# Patient Record
Sex: MALE | Race: WHITE | NOT HISPANIC OR LATINO | Employment: PART TIME | URBAN - METROPOLITAN AREA
[De-identification: names, ages, dates, MRNs, and addresses within clinical notes are randomized per-mention and may not be internally consistent; named-entity substitution may affect disease eponyms.]

---

## 2017-10-10 ENCOUNTER — HOSPITAL ENCOUNTER (EMERGENCY)
Facility: HOSPITAL | Age: 22
Discharge: HOME | End: 2017-10-10
Attending: EMERGENCY MEDICINE | Admitting: EMERGENCY MEDICINE

## 2017-10-10 ENCOUNTER — APPOINTMENT (OUTPATIENT)
Dept: GENERAL RADIOLOGY | Facility: HOSPITAL | Age: 22
End: 2017-10-10

## 2017-10-10 VITALS
BODY MASS INDEX: 35.89 KG/M2 | DIASTOLIC BLOOD PRESSURE: 92 MMHG | TEMPERATURE: 98.6 F | SYSTOLIC BLOOD PRESSURE: 152 MMHG | HEART RATE: 107 BPM | RESPIRATION RATE: 16 BRPM | WEIGHT: 265 LBS | OXYGEN SATURATION: 99 % | HEIGHT: 72 IN

## 2017-10-10 DIAGNOSIS — S90.511A ABRASION OF RIGHT ANKLE, INITIAL ENCOUNTER: ICD-10-CM

## 2017-10-10 DIAGNOSIS — S93.401A SPRAIN OF RIGHT ANKLE, UNSPECIFIED LIGAMENT, INITIAL ENCOUNTER: Primary | ICD-10-CM

## 2017-10-10 PROCEDURE — 99283 EMERGENCY DEPT VISIT LOW MDM: CPT

## 2017-10-10 PROCEDURE — 73610 X-RAY EXAM OF ANKLE: CPT

## 2017-10-10 PROCEDURE — 99282 EMERGENCY DEPT VISIT SF MDM: CPT | Performed by: EMERGENCY MEDICINE

## 2017-10-11 NOTE — ED PROVIDER NOTES
Subjective   History of Present Illness  History of Present Illness    Chief complaint: Ankle injury    Location: Right ankle    Quality/Severity:  Moderate, sharp pain    Timing/Onset/Duration: Acute onset just prior to arrival    Modifying Factors: It hurts to bear weight, feels better to not bear weight    Associated Symptoms: No numbness, tingling, or weakness.  No other complaints.    Narrative: This 22-year-old white male twisted his right ankle trying to help an elderly person on the sidewalk.  He complains of right ankle pain.  He has difficulty bearing weight.  He denies any numbness, tingling, weakness, or change in color or temperature.  The patient has an abrasion just inferior to the lateral malleolus.    PCP:  No Known Provider      Review of Systems   Musculoskeletal:        Right ankle pain        Medication List      Notice     You have not been prescribed any medications.        History reviewed. No pertinent past medical history.    No Known Allergies    History reviewed. No pertinent surgical history.    History reviewed. No pertinent family history.    Social History     Social History   • Marital status: Single     Spouse name: N/A   • Number of children: N/A   • Years of education: N/A     Social History Main Topics   • Smoking status: Never Smoker   • Smokeless tobacco: None   • Alcohol use No   • Drug use: None   • Sexual activity: Not Asked     Other Topics Concern   • None     Social History Narrative   • None           Objective   Physical Exam   Constitutional: He is oriented to person, place, and time. He appears well-developed and well-nourished. No distress.   ED Triage Vitals:  Temp: 98.6 °F (37 °C) (10/10/17 2209)  Heart Rate: 107 (10/10/17 2209)  Resp: 16 (10/10/17 2209)  BP: 152/92 (10/10/17 2209)  SpO2: 99 % (10/10/17 2209)  Temp src: Oral (10/10/17 2209)  Heart Rate Source: Monitor (10/10/17 2209)  Patient Position: Sitting (10/10/17 2209)  BP Location: Right arm (10/10/17  2209)  FiO2 (%): n/a    The patient's vitals were reviewed by me.  Unless otherwise noted they are within normal limits.  Patient is hypertensive with a blood pressure 152/92.     Musculoskeletal:   There is tenderness upon palpation of the right lateral malleolus.  There is a scab just inferior to the lateral malleolus.  There is minimal to moderate spinal swelling.  The capillary refill is less than 2 seconds.  The sensation is intact.  There is a normal range of motion noted.  There is no joint laxity noted.  There is no proximal fibular tenderness.  Extremities otherwise without tenderness or deformity.  There is a 2 posterior cells pedis and posterior tibial pulse.   Neurological: He is alert and oriented to person, place, and time.   Skin: He is not diaphoretic.   Nursing note and vitals reviewed.      Procedures         ED Course  ED Course                  MDM  No orders to display     Labs Reviewed - No data to display  No results found.    Final diagnoses:   None         ED Medications:  Medications - No data to display    New Medications:     Medication List      Notice     You have not been prescribed any medications.        Stopped Medications:     Medication List      Notice     You have not been prescribed any medications.          Final diagnoses:   Sprain of right ankle, unspecified ligament, initial encounter   Abrasion of right ankle, initial encounter            Amando Cervantes MD  10/10/17 [id number]

## 2017-10-11 NOTE — DISCHARGE INSTRUCTIONS
Non-weightbearing right ankle for 2 days, advance as tolerated.  Follow-up with Dr. Little in 1 week.  Return to emergency department there is increasing pain, numbness, tingling, weakness, change in color temperature, worse in any way at all.  Take Motrin or Tylenol as needed as directed for pain.